# Patient Record
Sex: FEMALE | NOT HISPANIC OR LATINO
[De-identification: names, ages, dates, MRNs, and addresses within clinical notes are randomized per-mention and may not be internally consistent; named-entity substitution may affect disease eponyms.]

---

## 2017-04-21 PROBLEM — Z00.00 ENCOUNTER FOR PREVENTIVE HEALTH EXAMINATION: Status: ACTIVE | Noted: 2017-04-21

## 2017-06-05 ENCOUNTER — APPOINTMENT (OUTPATIENT)
Dept: HEART AND VASCULAR | Facility: CLINIC | Age: 47
End: 2017-06-05

## 2017-06-19 ENCOUNTER — APPOINTMENT (OUTPATIENT)
Dept: HEART AND VASCULAR | Facility: CLINIC | Age: 47
End: 2017-06-19

## 2017-06-19 DIAGNOSIS — Z86.79 PERSONAL HISTORY OF OTHER DISEASES OF THE CIRCULATORY SYSTEM: ICD-10-CM

## 2017-06-19 DIAGNOSIS — M79.601 PAIN IN RIGHT ARM: ICD-10-CM

## 2017-06-19 DIAGNOSIS — Z83.49 FAMILY HISTORY OF OTHER ENDOCRINE, NUTRITIONAL AND METABOLIC DISEASES: ICD-10-CM

## 2017-06-19 DIAGNOSIS — Z82.49 FAMILY HISTORY OF ISCHEMIC HEART DISEASE AND OTHER DISEASES OF THE CIRCULATORY SYSTEM: ICD-10-CM

## 2017-06-19 DIAGNOSIS — I10 ESSENTIAL (PRIMARY) HYPERTENSION: ICD-10-CM

## 2017-06-27 PROBLEM — M79.601 PAIN OF RIGHT UPPER EXTREMITY: Status: ACTIVE | Noted: 2017-06-27

## 2017-06-27 PROBLEM — I10 HTN (HYPERTENSION), BENIGN: Status: RESOLVED | Noted: 2017-06-27 | Resolved: 2017-06-27

## 2017-06-27 PROBLEM — Z83.49 FAMILY HISTORY OF HYPERLIPIDEMIA: Status: ACTIVE | Noted: 2017-06-27

## 2017-06-27 PROBLEM — Z86.79 HISTORY OF PSEUDOANEURYSM: Status: RESOLVED | Noted: 2017-06-27 | Resolved: 2017-06-27

## 2017-06-27 PROBLEM — Z82.49 FAMILY HISTORY OF HYPERTENSION: Status: ACTIVE | Noted: 2017-06-27

## 2017-06-27 RX ORDER — OLMESARTAN MEDOXOMIL 20 MG/1
20 TABLET, FILM COATED ORAL
Refills: 0 | Status: ACTIVE | COMMUNITY

## 2017-06-27 RX ORDER — HYDROCHLOROTHIAZIDE 25 MG/1
25 TABLET ORAL
Refills: 0 | Status: ACTIVE | COMMUNITY

## 2017-06-27 RX ORDER — DESOGESTREL AND ETHINYL ESTRADIOL 0.15-0.03
KIT ORAL
Refills: 0 | Status: ACTIVE | COMMUNITY

## 2017-06-27 RX ORDER — BUPROPION HYDROCHLORIDE 300 MG/1
300 TABLET, EXTENDED RELEASE ORAL
Refills: 0 | Status: ACTIVE | COMMUNITY

## 2017-06-27 RX ORDER — ZOLPIDEM TARTRATE 5 MG/1
5 TABLET, FILM COATED ORAL
Refills: 0 | Status: ACTIVE | COMMUNITY

## 2017-08-09 NOTE — H&P ADULT - HISTORY OF PRESENT ILLNESS
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 Enriquez (10/2012) followed by large mass resection with Dr. Dior (11/2012) presented to Dr. Enriquez’s office with some mild tingling in her hand which she thought might be carpal tunnel syndrome. She also noticed that with repetitive motion the arm became easily fatigued. On physical exam in office the hand is well perfused and there is a 1+ at radial pulse. Dr. Enriquez’s ultrasound over the upper arm in office revealed the stent has no flow with reconstitution at the antecubital level with impression the stent was either narrow or more likely occluded. Pt now presents for RUE angiogram with possible intervention under general anesthesia. 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 Enriquez (10/2012) followed by large mass resection with Dr. Dior (11/2012) presented to Dr. Enriquez’s office with some mild tingling in her hand which she thought might be carpal tunnel syndrome. She also noticed that with repetitive motion the arm became easily fatigued. On physical exam in office the hand is well perfused and there is a 1+ at radial pulse. Dr. Enriquez’s ultrasound over the upper arm in office revealed the stent has no flow with reconstitution at the antecubital level with impression the stent was either narrow or more likely occluded. Pt denies any skin breakdown, swelling, acute pain, change in temperature of right arm/hand. Pt now presents for RUE angiogram with possible intervention under general anesthesia. 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 Enriquez (10/2012) followed by large mass resection with Dr. Dior (11/2012) presented to Dr. Enriquez’s office with some mild tingling in her hand which she thought might be carpal tunnel syndrome. She also noticed that with repetitive motion the arm became easily fatigued. On physical exam in office the hand is well perfused and there is a 1+ at radial pulse. Dr. Enriquez’s ultrasound over the upper arm in office revealed the stent has no flow with reconstitution at the antecubital level with impression the stent was either narrow or more likely occluded. Pt denies any skin breakdown, swelling, acute pain, change in temperature of right arm/hand. Pt now presents for RUE angiogram with possible intervention under general anesthesia.   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 Enriquez (10/2012) followed by large mass resection with Dr. Dior (11/2012) presented to Dr. Enriquez’s office with some mild tingling in her hand which she thought might be carpal tunnel syndrome. She also noticed that with repetitive motion the arm became easily fatigued. On physical exam in office the hand is well perfused and there is a 1+ at radial pulse. Dr. Enriquez’s ultrasound over the upper arm in office revealed the stent has no flow with reconstitution at the antecubital level with impression the stent was either narrow or more likely occluded. Pt denies any skin breakdown, swelling, acute pain, change in temperature of right arm/hand. Pt now presents for RUE angiogram with possible intervention under general anesthesia.   *LMP three weeks ago, B-HCG <0.1

## 2017-08-10 ENCOUNTER — OUTPATIENT (OUTPATIENT)
Dept: OUTPATIENT SERVICES | Facility: HOSPITAL | Age: 47
LOS: 1 days | Discharge: NURSING, REHAB OR LONG-TERM CARE | End: 2017-08-10
Payer: COMMERCIAL

## 2017-08-10 DIAGNOSIS — R22.31 LOCALIZED SWELLING, MASS AND LUMP, RIGHT UPPER LIMB: Chronic | ICD-10-CM

## 2017-08-10 DIAGNOSIS — F41.8 OTHER SPECIFIED ANXIETY DISORDERS: ICD-10-CM

## 2017-08-10 DIAGNOSIS — Z82.49 FAMILY HISTORY OF ISCHEMIC HEART DISEASE AND OTHER DISEASES OF THE CIRCULATORY SYSTEM: ICD-10-CM

## 2017-08-10 DIAGNOSIS — I72.8 ANEURYSM OF OTHER SPECIFIED ARTERIES: ICD-10-CM

## 2017-08-10 DIAGNOSIS — T82.856A STENOSIS OF PERIPHERAL VASCULAR STENT, INITIAL ENCOUNTER: ICD-10-CM
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
## 2017-08-10 DIAGNOSIS — I10 ESSENTIAL (PRIMARY) HYPERTENSION: ICD-10-CM

## 2017-08-10 LAB
ANION GAP SERPL CALC-SCNC: 15 MMOL/L — SIGNIFICANT CHANGE UP (ref 5–17)
APTT BLD: 27.1 SEC — LOW (ref 27.5–37.4)
BASOPHILS NFR BLD AUTO: 0.7 % — SIGNIFICANT CHANGE UP (ref 0–2)
BUN SERPL-MCNC: 10 MG/DL — SIGNIFICANT CHANGE UP (ref 7–23)
CALCIUM SERPL-MCNC: 9.3 MG/DL — SIGNIFICANT CHANGE UP (ref 8.4–10.5)
CHLORIDE SERPL-SCNC: 96 MMOL/L — SIGNIFICANT CHANGE UP (ref 96–108)
CO2 SERPL-SCNC: 22 MMOL/L — SIGNIFICANT CHANGE UP (ref 22–31)
CREAT SERPL-MCNC: 1 MG/DL — SIGNIFICANT CHANGE UP (ref 0.5–1.3)
EOSINOPHIL NFR BLD AUTO: 0.9 % — SIGNIFICANT CHANGE UP (ref 0–6)
GLUCOSE SERPL-MCNC: 90 MG/DL — SIGNIFICANT CHANGE UP (ref 70–99)
HCG SERPL-ACNC: <.1 MIU/ML — SIGNIFICANT CHANGE UP
HCT VFR BLD CALC: 40 % — SIGNIFICANT CHANGE UP (ref 34.5–45)
HGB BLD-MCNC: 13.6 G/DL — SIGNIFICANT CHANGE UP (ref 11.5–15.5)
INR BLD: 0.96 — SIGNIFICANT CHANGE UP (ref 0.88–1.16)
LYMPHOCYTES # BLD AUTO: 22.2 % — SIGNIFICANT CHANGE UP (ref 13–44)
MCHC RBC-ENTMCNC: 28.8 PG — SIGNIFICANT CHANGE UP (ref 27–34)
MCHC RBC-ENTMCNC: 34 G/DL — SIGNIFICANT CHANGE UP (ref 32–36)
MCV RBC AUTO: 84.7 FL — SIGNIFICANT CHANGE UP (ref 80–100)
MONOCYTES NFR BLD AUTO: 8.7 % — SIGNIFICANT CHANGE UP (ref 2–14)
NEUTROPHILS NFR BLD AUTO: 67.5 % — SIGNIFICANT CHANGE UP (ref 43–77)
PLATELET # BLD AUTO: 358 K/UL — SIGNIFICANT CHANGE UP (ref 150–400)
POTASSIUM SERPL-MCNC: 3.5 MMOL/L — SIGNIFICANT CHANGE UP (ref 3.5–5.3)
POTASSIUM SERPL-SCNC: 3.5 MMOL/L — SIGNIFICANT CHANGE UP (ref 3.5–5.3)
PROTHROM AB SERPL-ACNC: 10.7 SEC — SIGNIFICANT CHANGE UP (ref 9.8–12.7)
RBC # BLD: 4.72 M/UL — SIGNIFICANT CHANGE UP (ref 3.8–5.2)
RBC # FLD: 13.4 % — SIGNIFICANT CHANGE UP (ref 10.3–16.9)
SODIUM SERPL-SCNC: 133 MMOL/L — LOW (ref 135–145)
WBC # BLD: 8.5 K/UL — SIGNIFICANT CHANGE UP (ref 3.8–10.5)
WBC # FLD AUTO: 8.5 K/UL — SIGNIFICANT CHANGE UP (ref 3.8–10.5)

## 2017-08-10 PROCEDURE — C1894: CPT

## 2017-08-10 PROCEDURE — 80048 BASIC METABOLIC PNL TOTAL CA: CPT

## 2017-08-10 PROCEDURE — 75710 ARTERY X-RAYS ARM/LEG: CPT | Mod: 26

## 2017-08-10 PROCEDURE — 36216 PLACE CATHETER IN ARTERY: CPT

## 2017-08-10 PROCEDURE — C1769: CPT

## 2017-08-10 PROCEDURE — 85730 THROMBOPLASTIN TIME PARTIAL: CPT

## 2017-08-10 PROCEDURE — C1887: CPT

## 2017-08-10 PROCEDURE — 93010 ELECTROCARDIOGRAM REPORT: CPT

## 2017-08-10 PROCEDURE — 85610 PROTHROMBIN TIME: CPT

## 2017-08-10 PROCEDURE — C1889: CPT

## 2017-08-10 PROCEDURE — 85025 COMPLETE CBC W/AUTO DIFF WBC: CPT

## 2017-08-10 PROCEDURE — 84702 CHORIONIC GONADOTROPIN TEST: CPT

## 2017-08-10 PROCEDURE — 93005 ELECTROCARDIOGRAM TRACING: CPT

## 2017-08-10 RX ORDER — OLMESARTAN MEDOXOMIL 5 MG/1
2 TABLET, FILM COATED ORAL
Qty: 0 | Refills: 0 | COMMUNITY

## 2017-08-10 RX ORDER — BUPROPION HYDROCHLORIDE 150 MG/1
1 TABLET, EXTENDED RELEASE ORAL
Qty: 0 | Refills: 0 | COMMUNITY

## 2017-08-10 RX ORDER — ZOLPIDEM TARTRATE 10 MG/1
1 TABLET ORAL
Qty: 0 | Refills: 0 | COMMUNITY

## 2017-08-10 RX ORDER — CHLORHEXIDINE GLUCONATE 213 G/1000ML
1 SOLUTION TOPICAL ONCE
Qty: 0 | Refills: 0 | Status: DISCONTINUED | OUTPATIENT
Start: 2017-08-10 | End: 2017-08-10

## 2017-08-10 RX ORDER — POTASSIUM CHLORIDE 20 MEQ
40 PACKET (EA) ORAL ONCE
Qty: 0 | Refills: 0 | Status: COMPLETED | OUTPATIENT
Start: 2017-08-10 | End: 2017-08-10

## 2017-08-10 RX ORDER — SODIUM CHLORIDE 9 MG/ML
1000 INJECTION INTRAMUSCULAR; INTRAVENOUS; SUBCUTANEOUS
Qty: 0 | Refills: 0 | Status: DISCONTINUED | OUTPATIENT
Start: 2017-08-10 | End: 2017-08-10

## 2017-08-10 RX ORDER — ACETAMINOPHEN 500 MG
650 TABLET ORAL ONCE
Qty: 0 | Refills: 0 | Status: DISCONTINUED | OUTPATIENT
Start: 2017-08-10 | End: 2017-08-10

## 2017-08-10 RX ORDER — DESOGESTREL AND ETHINYL ESTRADIOL 0.15-0.03
1 KIT ORAL
Qty: 0 | Refills: 0 | COMMUNITY

## 2017-08-10 RX ADMIN — SODIUM CHLORIDE 80 MILLILITER(S): 9 INJECTION INTRAMUSCULAR; INTRAVENOUS; SUBCUTANEOUS at 14:04

## 2017-08-10 RX ADMIN — Medication 40 MILLIEQUIVALENT(S): at 17:28

## 2017-10-31 PROBLEM — I72.9 ANEURYSM OF UNSPECIFIED SITE: Chronic | Status: ACTIVE | Noted: 2017-08-09

## 2017-11-06 ENCOUNTER — APPOINTMENT (OUTPATIENT)
Dept: HEART AND VASCULAR | Facility: CLINIC | Age: 47
End: 2017-11-06
Payer: COMMERCIAL

## 2017-11-06 DIAGNOSIS — R20.2 ANESTHESIA OF SKIN: ICD-10-CM

## 2017-11-06 DIAGNOSIS — R20.0 ANESTHESIA OF SKIN: ICD-10-CM

## 2017-11-06 PROCEDURE — 99214 OFFICE O/P EST MOD 30 MIN: CPT

## 2017-11-06 PROCEDURE — 76882 US LMTD JT/FCL EVL NVASC XTR: CPT

## 2017-11-13 PROBLEM — R20.0 NUMBNESS AND TINGLING OF HAND: Status: ACTIVE | Noted: 2017-06-27

## 2021-03-26 NOTE — H&P ADULT - MUSCULOSKELETAL
negative Spiral Flap Text: The defect edges were debeveled with a #15 scalpel blade.  Given the location of the defect, shape of the defect and the proximity to free margins a spiral flap was deemed most appropriate.  Using a sterile surgical marker, an appropriate rotation flap was drawn incorporating the defect and placing the expected incisions within the relaxed skin tension lines where possible. The area thus outlined was incised deep to adipose tissue with a #15 scalpel blade.  The skin margins were undermined to an appropriate distance in all directions utilizing iris scissors.

## 2022-08-18 NOTE — BRIEF OPERATIVE NOTE - OPERATION/FINDINGS
Improved with propranolol  Continue    Occluded r brachial stent with extensive collateralization. Otherwise notmal arterial supply of RUE.